# Patient Record
Sex: MALE | Race: BLACK OR AFRICAN AMERICAN | NOT HISPANIC OR LATINO | URBAN - METROPOLITAN AREA
[De-identification: names, ages, dates, MRNs, and addresses within clinical notes are randomized per-mention and may not be internally consistent; named-entity substitution may affect disease eponyms.]

---

## 2022-12-30 ENCOUNTER — EMERGENCY (EMERGENCY)
Facility: HOSPITAL | Age: 47
LOS: 1 days | Discharge: HOME | End: 2022-12-30
Attending: EMERGENCY MEDICINE | Admitting: EMERGENCY MEDICINE
Payer: MEDICAID

## 2022-12-30 VITALS
SYSTOLIC BLOOD PRESSURE: 108 MMHG | OXYGEN SATURATION: 95 % | TEMPERATURE: 99 F | HEART RATE: 110 BPM | WEIGHT: 145.06 LBS | DIASTOLIC BLOOD PRESSURE: 66 MMHG | HEIGHT: 66.93 IN | RESPIRATION RATE: 18 BRPM

## 2022-12-30 VITALS
OXYGEN SATURATION: 97 % | HEART RATE: 105 BPM | DIASTOLIC BLOOD PRESSURE: 84 MMHG | RESPIRATION RATE: 19 BRPM | SYSTOLIC BLOOD PRESSURE: 122 MMHG | TEMPERATURE: 98 F

## 2022-12-30 PROCEDURE — 99284 EMERGENCY DEPT VISIT MOD MDM: CPT

## 2022-12-30 RX ORDER — IPRATROPIUM/ALBUTEROL SULFATE 18-103MCG
3 AEROSOL WITH ADAPTER (GRAM) INHALATION ONCE
Refills: 0 | Status: COMPLETED | OUTPATIENT
Start: 2022-12-30 | End: 2022-12-30

## 2022-12-30 RX ORDER — ALBUTEROL 90 UG/1
1 AEROSOL, METERED ORAL ONCE
Refills: 0 | Status: COMPLETED | OUTPATIENT
Start: 2022-12-30 | End: 2022-12-30

## 2022-12-30 RX ADMIN — Medication 50 MILLIGRAM(S): at 21:55

## 2022-12-30 RX ADMIN — Medication 3 MILLILITER(S): at 22:36

## 2022-12-30 RX ADMIN — Medication 3 MILLILITER(S): at 21:55

## 2022-12-30 RX ADMIN — ALBUTEROL 1 PUFF(S): 90 AEROSOL, METERED ORAL at 23:18

## 2022-12-30 NOTE — ED PROVIDER NOTE - NSFOLLOWUPINSTRUCTIONS_ED_ALL_ED_FT
USE THE INHALER EVERY 4-6 HOURS 2 PUFFS TOMORROW AND AFTER THAT EVERY 4-6 HOURS AS NEEDED FOR COUGH OR WHEEZE    REST AND KEEP WELL HYDRATED    RETURN IMMEDIATELY FOR:  FEVER  NEW COUGH  TROUBLE BREATHING  CHEST PAIN  ANY NEW, WORSENING OR CONCERNING SYMPTOMS      Asthma Attack    Upper body outline showing parts of the respiratory system, highlighting the bronchi.   Asthma attack, also called asthma flare or acute bronchospasm, is the sudden narrowing and tightening of the lower airways (bronchi) in the lungs, that can make it hard to breathe. The narrowing is caused by inflammation and tightening of the smooth muscle that wraps around the lower airways in the lungs.    Asthma attacks may cause coughing, high-pitched whistling sounds when you breathe, most often when you breathe out (wheezing), trouble breathing (shortness of breath), and chest pain. The airways may produce extra mucus caused by the inflammation and irritation. During an asthma attack, it can be difficult to breathe. It is important to get treatment right away. Asthma attacks can range from minor to life-threatening.      What are the causes?    Possible causes or triggers of this condition include:  •Household allergens like dust, pet dander, and cockroaches.      •Mold and pollen from trees or grass.      •Air pollutants such as household , aerosol sprays, strong odors, and smoke of any kind.      •Weather changes and cold air.      •Stress or strong emotions such as crying or laughing hard.      •Exercise or activity that requires a lot of energy.    •Certain medicines or medical conditions such as:  •Aspirin or beta-blockers.      •Infections or inflammatory conditions, such as a flu (influenza), a cold, pneumonia, or inflammation of the nasal membranes (rhinitis).      •Gastroesophageal reflux disease (GERD). GERD is a condition in which stomach acid backs up into your esophagus and spills into your trachea (windpipe), which can irritate your airways.          What are the signs or symptoms?    Symptoms of this condition include:  •Wheezing.      •Excessive coughing. This may only happen at night.      •Chest tightness or pain.      •Shortness of breath.      •Difficulty talking in complete sentences.      •Feeling like you cannot get enough air, no matter how hard you breathe (air hunger).        How is this diagnosed?    This condition may be diagnosed based on:  •A physical exam and your medical history.      •Your symptoms.    •Tests to check for other causes of your symptoms or other conditions that may have triggered your asthma attack. These tests may include:  •A chest X-ray.      •Blood tests.      •Lung function studies (spirometry) to evaluate the flow of air in your lungs.          How is this treated?    Treatment for this condition depends on the severity and cause of your asthma attack.•For mild attacks, you may receive medicines through a hand-held inhaler (metered dose inhaler or MDI) or through a device that turns liquid medicine into a mist (nebulizer). These medicines include:  •Quick relief or rescue medicines that quickly relax the airways and lungs.      •Long-acting medicines that are used daily to prevent (control) your asthma symptoms.        •For moderate or severe attacks, you may be treated with steroid medicines by mouth or through an IV injection at the hospital.      •For severe attacks, you may need oxygen therapy or a breathing machine (ventilator).      •If your asthma attack was caused by an infection from bacteria, you will be given antibiotic medicines.        Follow these instructions at home:    Medicines     •Take over-the-counter and prescription medicines only as told by your health care provider.       •If you were prescribed an antibiotic medicine, take it as told by your health care provider. Do not stop using the antibiotic even if you start to feel better.      •Tell your doctor if you are pregnant or may be pregnant to make sure your asthma medicine is safe to use during pregnancy.        Avoiding triggers   A sign showing that a person should not smoke.   •Keep track of things that trigger your asthma attacks. Avoid exposure to these triggers.      • Do not use any products that contain nicotine or tobacco. These products include cigarettes, chewing tobacco, and vaping devices, such as e-cigarettes. If you need help quitting, ask your health care provider.      •When there is a lot of pollen, air pollution, or humidity, keep windows closed and use an air conditioner or go to places with air conditioning.      Asthma action plan   •Work with your health care provider to make a written plan for managing and treating your asthma attacks (asthma action plan). This plan should include:  •A list of your asthma triggers and how to avoid them.      •A list of symptoms that you may have during an asthma attack.      •Information about which medicine to take, when to take the medicine, and how much of the medicine to take.      •Information to help you understand your peak flow measurements.      •Daily actions that you can take to control your asthma symptoms.      •Contact information for your health care providers.        •If you have an asthma attack, act quickly. Follow the emergency steps on your written asthma action plan. This may prevent you from needing to go to the hospital.      •Talk to a family member or close friend about your asthma action plan and who to contact in case you need help.      General instructions     •Avoid excessive exercise or activity until your asthma attack goes away.      •Stay up to date on all your vaccines, such as flu and pneumonia vaccines.      •Keep all follow-up visits. This is important.        Contact a health care provider if:    •You have followed your action plan for 1 hour and your peak flow reading is still at 50–79% of your personal best. This is in the yellow zone, which means "caution."      •You need to use your quick reliever medicine more frequently than normal.      •Your medicines are causing side effects, such as rash, itching, swelling, or trouble breathing.      •Your symptoms do not improve after taking medicine.      •You have a fever.        Get help right away if:    •Your peak flow reading is less than 50% of your personal best. This is in the red zone, which means "danger."      •You develop chest pain or discomfort.      •Your medicines no longer seem to be helping.      •You are coughing up bloody mucus.      •You have a fever and your symptoms suddenly get worse.      •You have trouble swallowing.      •You feel very tired, and breathing becomes tiring.      These symptoms may be an emergency. Get help right away. Call 911.   • Do not wait to see if the symptoms will go away.        • Do not drive yourself to the hospital.         Summary    •Asthma attacks are caused by narrowing or tightness in air passages, which causes shortness of breath, coughing, and wheezing.      •Many things can trigger an asthma attack, such as allergens, weather changes, exercise, strong odors, and smoke of any kind.      •If you have an asthma attack, act quickly. Follow the emergency steps on your written asthma action plan.      •Get help right away if you have severe trouble breathing, chest pain, or fever, or if your home medicines are no longer helping with your symptoms.      This information is not intended to replace advice given to you by your health care provider. Make sure you discuss any questions you have with your health care provider.      Document Revised: 10/09/2022 Document Reviewed: 10/09/2022    Elsevier Patient Education © 2022 [employer]

## 2022-12-30 NOTE — ED PROVIDER NOTE - PHYSICAL EXAMINATION
General:  Well appearing, no resp distress  HEENT:  No conjunctival injection, no ocular discharge  Chest:  Non-tender, no crepitance  Lungs:  Moderate wheezing bilaterally   Heart:  s1s2 normal, no murmur  Abdomen:  soft, non-tender, non-distended  :  Deferred  Rectal:  Deferred  Extremities: No edema, normal perfusion, no joint swelling or tenderness  Neuro:  Alert and oriented x 3, full strength, sensory grossly intact, normal gait  Psychiatry:  Calm, cooperative, no expression of suicidal or homicidal ideation

## 2022-12-30 NOTE — ED ADULT TRIAGE NOTE - CHIEF COMPLAINT QUOTE
BIBA with c/o asthma exacerbation - per EMS pt c/o difficulty breathing, wheezing, unable to speak in full sentences. given 1 combivent tx, 12mg dexamethasone IV and 2g magnesium IV via 20g L f/a. Pt had been working outside prior to symptom onset, states his inhaler was not helping.   symptoms improved after medications, able to speak in full sentences at this time.

## 2022-12-30 NOTE — ED PROVIDER NOTE - NS ED ROS FT
Constitutional:  No fever, no weakness, no dizziness  HEENT:  No change in vision, no discharge, no congestion  Cardiac:  No chest pain, palpitations  Pulm:  No cough, +wheezing/sob  GI:  No abd pain, no vomiting, no diarrhea  MSK:  No joint pain or swelling

## 2022-12-30 NOTE — ED PROVIDER NOTE - OBJECTIVE STATEMENT
Patient has been wheezing today and his inhaler ran out  No fever, cough or cold symptoms  Thinks it was triggered by the weather  No prior admissions  Quit smoking and drinking 3 wks ago Patient has been wheezing today and his inhaler ran out  No fever, cough or cold symptoms  Thinks it was triggered by the weather  No prior admissions  Quit smoking and drinking 3 wks ago  Feeling better after treatment by EMS.

## 2022-12-30 NOTE — ED PROVIDER NOTE - PATIENT PORTAL LINK FT
You can access the FollowMyHealth Patient Portal offered by [facility] by registering at the following website: [email or web address]. By joining [employer]’s FollowMyHealth portal, you will also be able to view your health information using other applications (apps) compatible with our system.

## 2022-12-30 NOTE — ED ADULT NURSE NOTE - OBJECTIVE STATEMENT
Patient A&Ox3, respirations even and unlabored; no signs of acute distress noted. BIBEMS with c/o asthma exacerbation and difficulty breathing s/p working outside. Patient medicated prior to arrival and reports improvement of symptoms; able to speak in full sentences.

## 2022-12-30 NOTE — ED ADULT NURSE NOTE - CINV DISCH MEDS REVIEWED YN
[FreeTextEntry1] : Patient seen in Urgent Care today.  COVID test negative.  CXR negative.  Started on 7 day course of abx for diagnosis of bronchitis.  Will reschedule treatment for 1 week and patient to call office at the end of this week to report if she is feeling better. Yes

## 2022-12-30 NOTE — ED PROVIDER NOTE - CLINICAL SUMMARY MEDICAL DECISION MAKING FREE TEXT BOX
Patient with asthma exacerbation secondary to changing weather.  His inhaler ran out.  Will give nebs, steroids and reassess.    2250:  patient feels "much better."  Still with mild bilateral wheeze, but significantly improved.  Ambulates without difficulty.  will dc.

## 2023-01-02 DIAGNOSIS — Z91.14 PATIENT'S OTHER NONCOMPLIANCE WITH MEDICATION REGIMEN: ICD-10-CM

## 2023-01-02 DIAGNOSIS — T48.6X6A UNDERDOSING OF ANTIASTHMATICS, INITIAL ENCOUNTER: ICD-10-CM
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
## 2023-01-02 DIAGNOSIS — Z87.891 PERSONAL HISTORY OF NICOTINE DEPENDENCE: ICD-10-CM

## 2023-01-02 DIAGNOSIS — M10.9 GOUT, UNSPECIFIED: ICD-10-CM
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
## 2023-01-02 DIAGNOSIS — R06.02 SHORTNESS OF BREATH: ICD-10-CM

## 2023-01-02 DIAGNOSIS — J45.909 UNSPECIFIED ASTHMA, UNCOMPLICATED: ICD-10-CM

## 2023-01-09 ENCOUNTER — EMERGENCY (EMERGENCY)
Facility: HOSPITAL | Age: 48
LOS: 1 days | Discharge: HOME | End: 2023-01-09
Admitting: EMERGENCY MEDICINE
Payer: MEDICAID

## 2023-01-09 VITALS
RESPIRATION RATE: 18 BRPM | DIASTOLIC BLOOD PRESSURE: 70 MMHG | TEMPERATURE: 98 F | SYSTOLIC BLOOD PRESSURE: 105 MMHG | OXYGEN SATURATION: 95 % | HEART RATE: 94 BPM

## 2023-01-09 VITALS
TEMPERATURE: 98 F | SYSTOLIC BLOOD PRESSURE: 105 MMHG | HEIGHT: 66.93 IN | HEART RATE: 95 BPM | OXYGEN SATURATION: 95 % | RESPIRATION RATE: 18 BRPM | WEIGHT: 149.91 LBS | DIASTOLIC BLOOD PRESSURE: 60 MMHG

## 2023-01-09 DIAGNOSIS — J45.909 UNSPECIFIED ASTHMA, UNCOMPLICATED: ICD-10-CM

## 2023-01-09 DIAGNOSIS — R06.2 WHEEZING: ICD-10-CM

## 2023-01-09 DIAGNOSIS — M10.9 GOUT, UNSPECIFIED: ICD-10-CM

## 2023-01-09 PROCEDURE — 99284 EMERGENCY DEPT VISIT MOD MDM: CPT

## 2023-01-09 RX ORDER — MAGNESIUM SULFATE 500 MG/ML
2 VIAL (ML) INJECTION ONCE
Refills: 0 | Status: COMPLETED | OUTPATIENT
Start: 2023-01-09 | End: 2023-01-09

## 2023-01-09 RX ORDER — ALBUTEROL 90 UG/1
2.5 AEROSOL, METERED ORAL
Refills: 0 | Status: COMPLETED | OUTPATIENT
Start: 2023-01-09 | End: 2023-01-09

## 2023-01-09 RX ADMIN — ALBUTEROL 2.5 MILLIGRAM(S): 90 AEROSOL, METERED ORAL at 17:07

## 2023-01-09 RX ADMIN — Medication 150 GRAM(S): at 17:03

## 2023-01-09 RX ADMIN — Medication 125 MILLIGRAM(S): at 17:03

## 2023-01-09 RX ADMIN — ALBUTEROL 2.5 MILLIGRAM(S): 90 AEROSOL, METERED ORAL at 17:03

## 2023-01-09 RX ADMIN — ALBUTEROL 2.5 MILLIGRAM(S): 90 AEROSOL, METERED ORAL at 17:08

## 2023-01-09 NOTE — ED PROVIDER NOTE - INTERNATIONAL TRAVEL
No
no lacrimation R/no diplopia/no discharge L/no lacrimation L/no discharge R/no blurred vision L/no photophobia

## 2023-01-09 NOTE — ED PROVIDER NOTE - PATIENT PORTAL LINK FT
You can access the FollowMyHealth Patient Portal offered by [facility] by registering at the following website: [email or web address]. By joining [employer]’s FollowMyHealth portal, you will also be able to view your health information using other applications (apps) compatible with our system.

## 2023-01-09 NOTE — ED PROVIDER NOTE - CLINICAL SUMMARY MEDICAL DECISION MAKING FREE TEXT BOX
Patient here with wheezing for several days. Without relief from inhaler   Exam with b/l wheezing   Plan: Nebs, steroids , mag     Patient symptomatically relieved  d/c with steroids and PCP follow up

## 2023-01-09 NOTE — ED ADULT TRIAGE NOTE - CHIEF COMPLAINT QUOTE
Pt walked in c/o of asthma exacerbation x 1 week. Pt reports partial relief from his inhaler. Speaking in full clear sentences. No history of intubation. VSS

## 2023-01-09 NOTE — ED PROVIDER NOTE - OBJECTIVE STATEMENT
48 y/o male hx of asthma here for increased wheezing the last few days. Denies fever, chills,, abdominal pain, change in bowel function, flank pain, rash, HA, dizziness, CP, palpitations, diaphoresis, cough, and malaise. Patient states his inhaler has not been working very well.

## 2023-01-09 NOTE — ED ADULT NURSE NOTE - OBJECTIVE STATEMENT
Pt presenting with asthma exacerbation. IV placed and pt medicated as per MD orders. Pt aox3, breathing  unlabored. Pt on CM with o2 99% on RA. Nebs in process. Denies hx of intubation

## 2023-01-10 PROBLEM — M10.9 GOUT, UNSPECIFIED: Chronic | Status: ACTIVE | Noted: 2022-12-30

## 2023-01-10 PROBLEM — J45.909 UNSPECIFIED ASTHMA, UNCOMPLICATED: Chronic | Status: ACTIVE | Noted: 2022-12-30
